# Patient Record
Sex: FEMALE | Race: BLACK OR AFRICAN AMERICAN | NOT HISPANIC OR LATINO | URBAN - METROPOLITAN AREA
[De-identification: names, ages, dates, MRNs, and addresses within clinical notes are randomized per-mention and may not be internally consistent; named-entity substitution may affect disease eponyms.]

---

## 2019-04-02 ENCOUNTER — EMERGENCY (EMERGENCY)
Facility: HOSPITAL | Age: 84
LOS: 1 days | Discharge: HOME | End: 2019-04-02
Admitting: EMERGENCY MEDICINE
Payer: MEDICARE

## 2019-04-02 VITALS
RESPIRATION RATE: 18 BRPM | OXYGEN SATURATION: 99 % | HEART RATE: 68 BPM | SYSTOLIC BLOOD PRESSURE: 136 MMHG | TEMPERATURE: 98 F | WEIGHT: 151.02 LBS | DIASTOLIC BLOOD PRESSURE: 80 MMHG

## 2019-04-02 DIAGNOSIS — I10 ESSENTIAL (PRIMARY) HYPERTENSION: ICD-10-CM

## 2019-04-02 DIAGNOSIS — H93.8X2 OTHER SPECIFIED DISORDERS OF LEFT EAR: ICD-10-CM

## 2019-04-02 DIAGNOSIS — H61.23 IMPACTED CERUMEN, BILATERAL: ICD-10-CM

## 2019-04-02 PROCEDURE — 99282 EMERGENCY DEPT VISIT SF MDM: CPT

## 2019-04-02 RX ORDER — CARBAMIDE PEROXIDE 81.86 MG/ML
5 SOLUTION/ DROPS AURICULAR (OTIC)
Qty: 1 | Refills: 0 | OUTPATIENT
Start: 2019-04-02 | End: 2019-04-04

## 2019-04-02 NOTE — ED PROVIDER NOTE - CARE PROVIDER_API CALL
Sophia Timmons)  Otolaryngology  [address] [place] [address], [address]  [place], [place] [address]  Phone: [phone]  Fax: [phone]  Follow Up Time:

## 2019-04-02 NOTE — ED ADULT TRIAGE NOTE - CHIEF COMPLAINT QUOTE
Patient complaining of left ear feeling clogged for four days.  Patient denies any pain, fevers, chills, discharge or bleeding from ear, loss of balance or any other complaints.  Patient states she has seen ENT in the past for wax removal.

## 2019-04-02 NOTE — ED PROVIDER NOTE - OBJECTIVE STATEMENT
82 y/o female with hx of HTN c/o clogged left ear x 4 days. pt states began after cleaning ears. similar symptoms in past and cleaned by ENT. no fever or chills. no pain or earache. no d/c. no further complaints

## 2019-04-02 NOTE — ED ADULT NURSE NOTE - OBJECTIVE STATEMENT
84 y/o F a&ox4 walked in from front triage c/o of clogged L ear. Was cleaning wax in ear when she pushed the was in too far with a q-tip. denies L ear pain. C/o of decrease hearing to L side.

## 2019-04-02 NOTE — ED PROVIDER NOTE - ENMT, MLM
Airway patent, Nasal mucosa clear. Mouth with normal mucosa. Throat has no vesicles, no oropharyngeal exudates and uvula is midline. + cerumen b/l ears. unable to visualize TMs. no mastoid tenderness. no d/c or exudate

## 2019-04-02 NOTE — ED PROVIDER NOTE - NSFOLLOWUPINSTRUCTIONS_ED_ALL_ED_FT
Follow up with ENT in one week.         Cerumen Impaction    WHAT YOU NEED TO KNOW:    Cerumen impaction is the blockage of the outer ear canal by tightly packed cerumen (earwax). It is generally treated with procedures such as flushing or suctioning the ear canal or the use of instruments to remove the impaction. Ear Anatomy         DISCHARGE INSTRUCTIONS:    Medicines:    Ear drops: These are used to soften the wax in your ear. Wax softening ear drops may be bought without a prescription. Ask your healthcare provider how often you should use this medicine. Read the instructions carefully before you use the ear drops. Do the following when you put in ear drops:   Warm the drops by holding the bottle in your hands for a few minutes. Cold ear drops may make you dizzy.      Lie down with the affected ear toward the ceiling. You may also stand with your head tilted to one side.      Pull your ear lobe up and back, and place the correct number of drops into the ear.      Keep your ear facing up for 5 to 10 minutes so the drops coat the outer ear canal.       Gently clean the outer part of the ear with a cotton swab. Do not place the cotton swab or anything inside your ear canal. This increases the risk of damaging your eardrum.       Take your medicine as directed. Contact your healthcare provider if you think your medicine is not helping or if you have side effects. Tell him of her if you are allergic to any medicine. Keep a list of the medicines, vitamins, and herbs you take. Include the amounts, and when and why you take them. Bring the list or the pill bottles to follow-up visits. Carry your medicine list with you in case of an emergency.    Follow up with your healthcare provider as directed: Write down your questions so you remember to ask them during your visits.     Contact your healthcare provider if:     You have a fever.       You have trouble hearing or ringing in your ear.      You have questions about your condition or care.    Return to the emergency department if:     You feel dizzy.      You have discharge or blood coming out of your ear.      Your ear pain does not go away or gets worse.         © Copyright "[employer] 2019 All illustrations and images included in CareNotes are the copyrighted property of [employer] or [employer].      back to top                      © Copyright "[employer] 2019

## 2019-04-02 NOTE — ED PROVIDER NOTE - CLINICAL SUMMARY MEDICAL DECISION MAKING FREE TEXT BOX
cerumen impaction b/l ears. left ear irrgated only as unable to hear from left ear. partial cerumen removed with irrigation and pt reports able to hear again. no evidence of infection on exam. will tx with debrox and f/u with ENT for definitive cerumen removal.

## 2019-04-02 NOTE — ED ADULT NURSE NOTE - NSIMPLEMENTINTERV_GEN_ALL_ED
Implemented All Universal Safety Interventions:  [place] to call system. Call bell, personal items and telephone within reach. Instruct patient to call for assistance. Room bathroom lighting operational. Non-slip footwear when patient is off stretcher. Physically safe environment: no spills, clutter or unnecessary equipment. Stretcher in lowest position, wheels locked, appropriate side rails in place.